# Patient Record
Sex: FEMALE | Race: WHITE | Employment: STUDENT | URBAN - METROPOLITAN AREA
[De-identification: names, ages, dates, MRNs, and addresses within clinical notes are randomized per-mention and may not be internally consistent; named-entity substitution may affect disease eponyms.]

---
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
## 2024-11-21 ENCOUNTER — TELEPHONE (OUTPATIENT)
Dept: OBGYN CLINIC | Facility: CLINIC | Age: 24
End: 2024-11-21

## 2024-11-21 DIAGNOSIS — N91.2 AMENORRHEA: Primary | ICD-10-CM

## 2024-11-21 NOTE — TELEPHONE ENCOUNTER
Patient calling to initiate prenatal care  LMP 10/23  Patient is 7-8 weeks on 12/18   Confirmation Ultrasound and Appointment scheduled on   Future Appointments   Date Time Provider Department Center   12/20/2024 10:15 AM [facility] US [facility] [address]   12/20/2024 11:45 AM Alivia Geronimo DO [facility] [address]   1/21/2025  3:30 PM Lev Lee MD [facility] [address]   2/17/2025  3:00 PM Esperanza Esqueda MD [facility] [address]         Any history of ectopic pregnancy? n  Any history of miscarriage? n  Any medications that you are taking on a regular basis other than prenatal vitamins? n (if not taking prenatal vitamins, encourage patient to start taking.)  Any bleeding since the first day of last LMP and your positive pregnancy test? n    Insurance [employer] PPO

## 2024-12-20 NOTE — PROGRESS NOTES
[facility]  Obstetrics and Gynecology    Subjective:     Diana Lewis is a 24 year old  female presents with c/o secondary amenorrhea and positive pregnancy test. The patient was recommended to return for further evaluation. The patient reports some constipation and gas pain, has tried Miralax/Metamucil intermittently w/o significant relief. Advised OTC Colace BID with prn simethicone for additional symptomatic relief. Patient's last menstrual period was 10/23/2024 (exact date)..     Pregnancy planned?: yes  Pregnancy desired? yes    Review of Systems:  General: no complaints per category. See HPI for additional information.   Breast: no complaints per category. See HPI for additional information.   Respiratory: no complaints per category. See HPI for additional information.   Cardiovascular: no complaints per category. See HPI for additional information.   GI: no complaints per category. See HPI for additional information.   : no complaints per category. See HPI for additional information.   Heme: no complaints per category. See HPI for additional information.     OB History:   OB History    Para Term  AB Living   1 0 0 0 0 0   SAB IAB Ectopic Multiple Live Births   0 0 0 0 0      # Outcome Date GA Lbr Al/2nd Weight Sex Type Anes PTL Lv   1 Current                Gyne History:  Menarche: 10  Period Cycle (Days): Pregnant  Period Duration (Days): N/A  Period Flow: N/A  Hx Prior Abnormal Pap: No  Pap Date: 10/10/22  Pap Result Notes: WNL  Patient's last menstrual period was 10/23/2024 (exact date).      Meds:  Medications Ordered Prior to Encounter[1]    All:  Allergies[2]    PMH:  Past Medical History:    Migraines       PSH:  Past Surgical History:   Procedure Laterality Date    [place] teeth removed         Objective:     Vitals:    24 1134   BP: 108/64   Pulse: 85   Weight: 181 lb 3.2 oz (82.2 kg)         Body mass index is 32.1 kg/m².    General: AAO.NAD.   CVS  exam: normal peripheral perfusion  Chest: non-labored breathing, no tachypnea   Abdominal exam: deferred   Pelvic exam: deferred        Imaging:  First Trimester US   GA by LMP: 8w2d   GA by US: 8w5d   FHT: 159 bpm   Impression: Single live IUP. Early viable. Gestational sac, yolk sac and fetal pole seen. Both ovaries wnl. No free fluid. Cardiac activity noted. Cervix appears closed and wnl.   RAMBO 2025 by LMP c/w 8 wk US        Assessment:     Diana Lewis is a 24 year old  female who presents for secondary amenorrhea and positive pregnancy test          Plan:     Patient Active Problem List    Diagnosis    Anxiety     Sertraline 25mg daily      Well adult exam    Injury of left ankle, initial encounter    Migraine with status migrainosus    Finger fracture    Allergic rhinitis, cause unspecified     negative allergy testing 09             Secondary amenorrhea  - a/w positive pregnancy test  - Ultrasound reviewed and discussed with patient, refer above  - Pt counseled on safety, diet, exercise, caffiene, tobacco, ETOH, sexual activity, ER precautions, diet, exercise, appropriate otc medication use, substance abuse   - Counseled on taking a PNV with 0.4mg of folic acid   - genetic screening testing d/w patient and family, pt declines invasive diagnostic testing and considering first versus second trimester screening   -Carrier screening, neural tube defect screening and hemoglobinopathy screening reviewed and discussed with patient; information provided and patient considering options and recommended to request desired screening at follow-up visit  - advised to follow up to establish prenatal care   - SAB precautions provided   - d/w nausea and vomiting in pregnancy including vitamin B 6 and unisom   - COVID 19 precautions provided, recommend vaccination and booster if/when applicable   -Travel precautions provided, patient advised to not travel beyond 36 weeks as long as the pregnancy  remains low risk.  Advised not to travel beyond 28 weeks for high risk pregnancy. Recommend compression socks, increase water intake and movement during traveling to prevent blood clots.    All of the findings and plan were discussed with the patient.  She notes understanding and agrees with the plan of care.  All questions were answered to the best of my ability at this time.      Total patient time was 30 minutes in evaluation, consultation, and coordination of care. This included face to face and non-face to face actions. The patient's questions and concerns that were addressed.     RTC in 4 weeks for NOB visit or sooner if needed     Alivia Geronimo,   [facility] - OBGYN     Discussed with patient that there will not be further notification of normal or benign results other than receiving results on [facility]. A [employer] message or telephone call will be placed by the physician and/or office staff if results are abnormal.         Note to patient and family   The 21st Century Cures Act makes medical notes available to patients in the interest of transparency.  However, please be advised that this is a medical document.  It is intended as ytpi-sc-ldpb communication.  It is written and medical language may contain abbreviations or verbiage that are technical and unfamiliar.  It may appear blunt or direct.  Medical documents are intended to carry relevant information, facts as evident, and the clinical opinion of the practitioner.      This note could include assistance by Dragon voice recognition. Errors in content may be related to improper recognition by the system; efforts to review and correct have been done but errors may still exist.          [1]   Current Outpatient Medications on File Prior to Visit   Medication Sig Dispense Refill    sertraline 25 MG Oral Tab Take 1 tablet (25 mg total) by mouth daily.      prenatal vitamin with DHA 27-0.8-228 MG Oral Cap Take 1 capsule by mouth daily.      SUMAtriptan  Succinate (IMITREX) 50 MG Oral Tab One po at HA onset, may repeat dose in 2 hrs prn. Max 2 pills in 24 hrs. 9 tablet 5    NORETHINDRONE ACET-ETHINYL EST 1-20 MG-MCG Oral Tab TAKE 1 TABLET DAILY 84 tablet 3    azithromycin (ZITHROMAX Z-DA) 250 MG Oral Tab Take two tablets today, then one tablet daily for 4 more days 6 tablet 0     No current facility-administered medications on file prior to visit.   [2] No Known Allergies

## 2024-12-20 NOTE — PATIENT INSTRUCTIONS
[facility]- Prenatal Testing    The following information is designed to help you understand some of the optional tests that are available to you to screen for problems in your pregnancy.  Before considering any of these tests, you will need to consider the following questions:    [1] What would you do if an abnormality were detected?  If the answer is nothing, then you may decide to decline all testing.  [2] Would you be willing to undergo testing which might increase your risk of miscarriage, such as an amniocentesis or CVS (see below)?  [3] If you have the initial testing, and it indicates that there might be a problem, and you subsequently decide not to do invasive testing such as an amniocentesis, would you worry excessively through the remainder of the pregnancy?    The following tests are available to screen for problems in your pregnancy:      [1]  First Trimester Screening (also called Nuchal Thickness, Nuchal Translucency or NT)- This is an abdominal ultrasound done between 10 and 14 weeks by a perinatology specialist (Maternal Fetal Medicine, MFM) which measures the thickness of the skin behind your baby's neck.  Increased thickness of the skin in this area has been linked to an increased risk of genetic abnormalities like Down Syndrome.  A second visit may be required if the baby is not in the correct position.  The ultrasound results are combined with a finger stick blood test to increase the sensitivity of the test.  You will need to schedule an appointment with the Maternal Fetal Medicine office.  Address and phone number below:  Please verify insurance coverage:  CPT code: 17108 (trejo) or 42118 (twins)  Diagnosis: Genetic Screening- Z36.8A  Maternal Fetal Medicine  Dr. Rushing, Dr. Ordoñez, Dr. Patterson, and Dr. Miller  [address] [facility] [address] [place] [address]  Phone [phone]  Fax [phone]    [2] Cell-Free DNA testing (NIPT)- This is a blood test done any time after 10-11  weeks which screens for genetic abnormalities like Down Syndrome.  It is greater than 98% sensitive but requires an amniocentesis for confirmation if abnormal.  It can also tell you the sex of the baby.  CPT code: 13954  Diagnosis code: Genetic screening- Z36.8A  Testing options:   Lydia- preferred for IHP patients or all patients.  Please call [phone] or go to [employer]/empower to review billing, prior authorizations or referrals  [id number]- Optional for all insurance plans except [facility].  Please call [employer] at [phone] or go to [employer]/patients/[email or web address]#/ to review billing, prior authorizations, or referrals      [3]  Quad Screen (also called AFP-plus or Tetra Screen)-  This is a simple blood test which screens for both genetic abnormalities like Down Syndrome and neural tube defects (NTDs), such as spina bifida (an abnormal opening in the spine which can cause paralysis).  It is usually performed around 16-20 weeks.  If the Quad screen comes back abnormal, it does not mean that your baby has an abnormality.  It only means that there is an increased risk of an abnormality.  Additional testing such as a Level II Ultrasound or Amniocentesis may be recommended (see below).  This test is less accurate at picking up genetic abnormalities than the cell-free DNA test.  If you have test #1 or 2, then usually only the AFP part of the Quad screen would be performed to screen for NTD's (see below).  Please verify insurance coverage:  CPT code: 03857  Diagnosis code: Genetic screening- Z36.8A    [4]  Alpha Fetoprotein (AFP, MSAFP)- This is a simple blood test that screens for neural tube defects such as spina bifida (an abnormal opening in the spine).  It is usually performed around 16-20 weeks.  Additional testing such as a Level II ultrasound may be recommended for an abnormal test result.  Please verify insurance coverage:  CPT code: 50030 Diagnosis code: Genetic Screening-  Z36.8A    ---------------------------------------------------------------------------------------------------------------    Carrier screening:     [5] Cystic Fibrosis/SMA Carrier Screening- Cystic Fibrosis is a genetic disease which causes lung problems in the infant.  SMA (spinal muscular atrophy) is a genetic disease that affects the nerve cells of the spinal cord.  These genetic defects would need to be passed from both parents to the child.  A blood test is performed on the mother, and if her test is positive, then the father should also be tested. These tests can be done at any time in the pregnancy, usually in the first trimester with your initial OB labs.  All babies are screened for cystic fibrosis after birth.  Please verify insurance coverage:  Cystic Fibrosis CPT Code: 84766 Diagnosis code: Cystic Fibrosis screening- Z13.228  SMA CPT Code: 88505 Diagnosis code: Genetic Screening- Z36.8A or Testing for Genetic Disease Carrier- Z13.71    [6] Hemoglobinopathy carrier screening: The hemoglobinopathies are heterogeneous genetic disorders of hemoglobin (Hb) typically inherited in an autosomal recessive pattern. The clinical presentation ranges from asymptomatic in carriers to mild to severe disease in homozygotes and compound heterozygotes. At the severe end of the spectrum, hemoglobinopathies impact quality of life, require life-long care (typically with regular blood transfusions), and can shorten life expectancy. In the [place], the American College of Obstetricians and Gynecologists (ACOG) recommends carrier screening and counseling for genetic conditions, ideally before pregnancy. Firstline for hemoglobinopathy carrier screening, includes a CBC evaluating red cell indices in all pregnant individuals [17]. A hemoglobin electrophoresis (or other protein chemistry method) is performed in addition to a CBC if there is suspicion of hemoglobinopathy based on ethnicity (, Mediterranean, Middle  Eastern, Southeast , or [place] descent) or if red cell indices show a low mean MCV or MCH. Characteristics other than ethnicity that are associated with a higher risk for an individual to be a hemoglobinopathy carrier include a history of chronic anemia or stillbirth, a relative with a hemoglobin structural variant or thalassemia, and consanguinity In 2022 ACOG updated its recommendations to offer universal hemoglobinopathy testing to people planning pregnancy or at the initial prenatal visit if no prior testing results are available for interpretation. This is based on a high frequency of a hemoglobinopathy trait, in particular S trait, in the [place] and noting that race and self-identified ethnicity are poor alternatives for genetics.  Hemoglobin electrophoresis: Send out lab to [facility].  CPT Code: 04747 or 87774 or 28940 Diagnosis code: Z13.0 Encounter screening for hematological disorder    Asthmatracker Carrier Screening: A carrier screening panel is available that includes cystic fibrosis, spinal muscular atrophy, hemoglobinopathy and additional autosomal recessive or X-linked disorders.  A full review of the carrier screening panel was available via [employer] website. Please call [phone] or go to [employer]/empower to review billing, prior authorizations or referrals.       ---------------------------------------------------------------------------------------------------------------    [7]  Level II Ultrasound-  This is an abdominal ultrasound done at approximately 20-21 weeks by a perinatology specialist (RAMIREZ) at [facility] which looks at many of the baby's internal structures.  Abnormalities in certain structures have been associated with an increased risk of genetic abnormalities.  It also screens for NTD's.  This ultrasound would replace the Level I Ultrasound that we normally perform at our office around the same time.    [8]  Amniocentesis-  During this procedure, a needle is  passed through your abdominal wall to withdrawal some amniotic fluid from around the baby.  It screens for both genetic abnormalities and NTD's and is usually performed around 15-16 weeks.  This test has the highest accuracy for detecting genetic abnormalities, but there may be a small risk of miscarriage or other complications.  A similar procedure called Chorionic Villus Sampling (CVS) is performed by passing a catheter through the vagina to remove a small sample of tissue from the placenta (afterbirth).  CVS may have a higher risk of miscarriage and other rare complications compared to amniocentesis but can be performed earlier in pregnancy.  Amniocentesis is often recommended/offered if any of the previously mentioned tests come back abnormal.  A pamphlet is available if you would like more information about this option.  If you decide to have an amniocentesis, the other tests would be unnecessary.      The above information covers some of the basics.  Pamphlets on the Cell-Free DNA test, Quad Screen and Cystic Fibrosis test should be in your prenatal packet.  Your doctor will discuss this information in more detail, and feel free to ask additional questions.  Also, remember that all of these tests are optional, and will only be performed at your request.  Testing that needs to be done through the perinatologist's office may require 2-3 weeks lead time to schedule.     Foods to Avoid During Pregnancy  Deli Meats- You may eat deli meats from the deli.  If you eat deli meats in the package, it may be contaminated with Listeria. Heat all deli meats in a package to 165 degrees Fahrenheit before eating, even if the label states the meat is “pre-cooked”.    Soft Cheeses and Unpasteurized Products - You may eat soft cheeses that are pasteurized.  Please avoid all soft cheeses, milk or juice that is unpasteurized.  Fish- avoid fish that contains a high level of mercury. These include but are not limited to; [place]  Orange Roughy, Tile Fish, Shark, Swordfish, and Devante Mackerel. Please see chart below for good fish choices in pregnancy. Also avoid any raw, uncooked shellfish such as oysters, clams, or sushi.    Make sure to cook all meats; including ground beef, pork, and poultry to their desired temperatures before consuming. This reduces the chance of a food borne illness.  Caffeine- limit to 200 mg or an 8oz cup daily or less.   Alcohol- no amount of alcohol is determined to be safe in pregnancy. Do not drink any alcoholic beverages while pregnant.  Medications Safe in Pregnancy  The following over-the-counter medications may be taken safely after 12 weeks gestation by any patient who is pregnant.  Please follow the instructions on the package for adult dosage.  If you experience any symptoms prior to 12 weeks, please call the office at [phone].      Colds/Congestion: Flonase, Saline Nasal Spray, Neti Pot, Plain Robitussin, Robitussin DM, Mucinex DM, Vicks 44 E, Vicks Vapor rub, Cough drops without Zinc or Sudafed.   Contact your doctor if you have a persistent fever over 100.4, shortness of breath, coughing up green mucus, or a sore throat that persists from more than 3 days    Diarrhea: Imodium, Maalox Anti-Diarrheal or Kaopectate  Contact the office if you have diarrhea for more than 3 days.    Allergies: Benadryl, Claritin or Zyrtec    Hemorrhoids: Tucks pads, Preparation H, Annusol, Sitz bath or Witch hazel  Eat a high fiber diet and drink plenty of fluids.    Yeast: Monistat 3 or 7  Call if your symptoms do not improve, or if you experience vaginal bleeding, or a watery discharge.    Constipation: Metamucil, Colace, fiber supplement, Milk of Magnesia or Dulcolax  Drink plenty of fluids, eat high-fiber foods and take the above laxatives sporadically.     Headache or Mild aches and pain: Extra Strength Tylenol     Gas: Gas X, Gelusil, Papaya Tablets, Maalox, Mylicon or Mylanta Gas    Heartburn: Tums, Mylanta, Pepcid  AC or Maalox    Sore throat: Alcohol free Chloraseptic spray or Lozenges     Nausea and Vomiting: ½ tablet Unisom plus 100mg of Vitamin B6 at bedtime (may increase B6 up to 200mg per day), Maxine root tea or raspberry leaf tea    Insomnia: Tylenol PM, Vitamin B6 50mg, warm bath or milk, Unisom, Nytol or Sominex.     We have listed a few medications for common symptoms seen in pregnancy.  Please contact the office if you are unsure about any over the counter medications that are not listed above.

## 2025-01-21 NOTE — PROGRESS NOTES
[facility]  Obstetrics and Gynecology  History & Physical    CC: Patient is here to establish prenatal care     Subjective:     HPI:  Diana Lewis is a 25 year old  female at 12w6d who presents today to establish prenatal care.      Denies vaginal bleeding, abdominal/pelvic pain, nausea and vomiting.     LMP: Patient's last menstrual period was 10/23/2024 (exact date).  RAMBO:  Estimated Date of Delivery: 25    OB:  OB History    Para Term  AB Living   1 0 0 0 0 0   SAB IAB Ectopic Multiple Live Births   0 0 0 0 0      # Outcome Date GA Lbr Al/2nd Weight Sex Type Anes PTL Lv   1 Current                  GYN:        STD hx- denies.   Pap hx- 10/2022 NILM - due 10/2025     PMH:   Past Medical History:    Migraines       PSH:    Past Surgical History:   Procedure Laterality Date    [place] teeth removed         MEDS:  Medications Ordered Prior to Encounter[1]    Allergies:    Allergies[2]  denies latex.     Immunizations:  Immunization History   Administered Date(s) Administered    Covid-19 Vaccine Pfizer 30 mcg/0.3 ml 2021, 2021    DTAP 2005    DTAP/HIB 2000, 2000, 2000, 2001    HEP A 2012, 2013    HEP B 2000, 10/20/2000, 2001    HIB 2000, 2000, 2000, 2001, 2001    Hpv Virus Vaccine 9 Leana Im 2020, 2020, 2020    IPV 2000, 2000, 2001, 2005    MMR 2001, 2005    Meningococcal-Menactra 2011, 2016    TDAP 2011, 2021    Tb Intradermal Test 2001, 2020    Varicella Deferred (Had Chicken Pox) 2004       Family Hx:      Family History   Problem Relation Age of Onset    Other (Other) Mother         polycystic kidney disease    Other (PCKD) Mother     Diabetes Maternal Grandfather     Breast Cancer Maternal Grandmother     Other (polycystic kidney) Maternal Grandmother        SocialHx:         Social History     Socioeconomic History    Marital status: Single   Occupational History    Occupation: --finishing at [employer]   Tobacco Use    Smoking status: Never    Smokeless tobacco: Never   Vaping Use    Vaping status: Never Used   Substance and Sexual Activity    Alcohol use: Not Currently    Drug use: No    Sexual activity: Yes     Partners: Male   Other Topics Concern     Service No    Blood Transfusions No    Caffeine Concern Yes    Exercise Yes     Comment: 3 times per week    Seat Belt Yes     Social Drivers of Health     Financial Resource Strain: Low Risk  (1/20/2025)    Financial Resource Strain     Difficulty of Paying Living Expenses: Not hard at all     Med Affordability: No   Food Insecurity: No Food Insecurity (1/20/2025)    Food Insecurity     Food Insecurity: Never true   Transportation Needs: No Transportation Needs (1/20/2025)    Transportation Needs     Lack of Transportation: No   Stress: No Stress Concern Present (1/20/2025)    Stress     Feeling of Stress : No   Housing Stability: Low Risk  (1/20/2025)    Housing Stability     Housing Instability: No         Patient feels unsafe or threatened?: No       Health maintenance:  Mammogram (age 40 and q1-2yr):  N/A  Colonoscopy (age 50 and q10yr):  N/A    Review of Systems:  General: no complaints per category. See HPI for additional information.   Breast: no complaints per category. See HPI for additional information.   Respiratory: no complaints per category. See HPI for additional information.   Cardiovascular: no complaints per category. See HPI for additional information.   GI: no complaints per category. See HPI for additional information.   : no complaints per category. See HPI for additional information.   Heme: no complaints per category. See HPI for additional information.     Objective:     PE:  Vitals: /78   Pulse 97   Ht 63\"   Wt 185 lb (83.9 kg)   LMP 10/23/2024 (Exact Date)   BMI  32.77 kg/m²   Gen: A/O, NAD  Head/Neck: neck supple, thyroid non-enlarged, symmetric and without nodules  Breasts: breasts symmetric, no dominant or suspicious mass, no skin or nipple changes and no axillary adenopathy  CV: normal peripheral perfusion   Lungs: no tachypnea, non-labored breathing   Abdominal exam: soft, nontender, nondistended  Ext: non-tender, no edema  :  1) External Genitalia -  Normal appearing skin and hair distribution, no visible lesions.   2) Vagina:  mucosa appears pink, and well rugated.   3) Cervix:  pink cervix with visible ectropion friable to touch.   4) Uterus: Anteverted. Mobile. 16 week gestational size.   5) Adnexa/parametria:  Non tender adnexa. No masses.     Fetal Well Being Assessment:     BPM    Assessment/Plan:     Diana Lewis is a 25 year old  female at 12w6d who presents today to establish prenatal care.    Patient Active Problem List    Diagnosis    Anxiety     Sertraline 25mg daily      Well adult exam    Injury of left ankle, initial encounter    Migraine with status migrainosus    Finger fracture    Allergic rhinitis, cause unspecified     negative allergy testing 09           Prenatal care  - prenatal labs ordered  - Pap: collected given presence of cervical ectropion   - Cervical cultures: GC, Chl  collected and ordered   - continue PNV daily given        Genetic Screening tests  - Discussion held with patient about genetic screening. Discussion including first trimester versus second trimester screening. Patient and partner declined genetic screening. Patient offered Amniocentesis and declined. Pt considering first or second trimester screening. Advised to call the office if she would like to pursue first trimester screening as recommended to complete at 13wks GA  - Cystic fibrosis/SMA/hemoglobinopathy carrier screening: offered and patient will consider, advised to notify office if she desires order        Patient education  - Pt  counseled on safety, diet, exercise, caffiene, tobacco, ETOH, sexual activity, ER precautions, diet, exercise, appropriate weight gain, travel, appropriate otc medication use, substance abuse and pets.  - Counseled on taking a PNV with 0.4mg of folic acid   - Limiting intake of alcohol, coffee, tea, soda, and other foods containing caffeine to 200 mg every day   - Limit intake of fish to twice weekly to limit mercury exposure  - COVID 19 precautions provided    - Pregnancy BMI guidelines:    <18.5 = underweight = 28-40 lbs gain = 1 lb/wk (2nd/3rd tri)   18.5 - 24.9 = normal = 25 - 35 lbs gain = 1 lb/wk   25 - 29.9 = overweight = 15 - 25 lbs gain = 0.6 lb/wk   > 30 = obese = 11 - 20 lbs gain = 0.5 lb/wk       RTC in 4 wks     Lev Lee MD   [facility] - OBGYN      Note to patient and family   The 21st Century Cures Act makes medical notes available to patients in the interest of transparency.  However, please be advised that this is a medical document.  It is intended as woqo-nl-uxte communication.  It is written and medical language may contain abbreviations or verbiage that are technical and unfamiliar.  It may appear blunt or direct.  Medical documents are intended to carry relevant information, facts as evident, and the clinical opinion of the practitioner.                        [1]   Current Outpatient Medications on File Prior to Visit   Medication Sig Dispense Refill    sertraline 25 MG Oral Tab Take 1 tablet (25 mg total) by mouth daily.      prenatal vitamin with DHA 27-0.8-228 MG Oral Cap Take 1 capsule by mouth daily.      SUMAtriptan Succinate (IMITREX) 50 MG Oral Tab One po at HA onset, may repeat dose in 2 hrs prn. Max 2 pills in 24 hrs. (Patient not taking: Reported on 1/21/2025) 9 tablet 5     No current facility-administered medications on file prior to visit.   [2] No Known Allergies

## 2025-01-21 NOTE — PATIENT INSTRUCTIONS
Maternal Fetal Medicine  Dr. Rushing, Dr. Ordoñez, Dr. Patterson, and Dr. Miller  [address] [facility] [address] [place] [id number]  Phone [phone]  Fax [phone]    [2] Cell-Free DNA testing (NIPT)- This is a blood test done any time after 10-11 weeks which screens for genetic abnormalities like Down Syndrome.  It is greater than 98% sensitive but requires an amniocentesis for confirmation if abnormal.  It can also tell you the sex of the baby.  CPT code: 79156  Diagnosis code: Genetic screening- Z36.8A  Testing options:   Lydia- preferred for IHP patients or all patients.  Please call [phone] or go to [email or web address] to review billing, prior authorizations or referrals  [id number]- Optional for all insurance plans except IHP.  Please call [employer] at [phone] or go to [email or web address] to review billing, prior authorizations, or referrals    ---------------------------------------------------------------------------------------------------------------    Carrier screening:     [5] Cystic Fibrosis/SMA Carrier Screening- Cystic Fibrosis is a genetic disease which causes lung problems in the infant.  SMA (spinal muscular atrophy) is a genetic disease that affects the nerve cells of the spinal cord.  These genetic defects would need to be passed from both parents to the child.  A blood test is performed on the mother, and if her test is positive, then the father should also be tested. These tests can be done at any time in the pregnancy, usually in the first trimester with your initial OB labs.  All babies are screened for cystic fibrosis after birth.  Please verify insurance coverage:  Cystic Fibrosis CPT Code: 91321 Diagnosis code: Cystic Fibrosis screening- Z13.228  SMA CPT Code: 11243 Diagnosis code: Genetic Screening- Z36.8A or Testing for Genetic Disease Carrier-  Z13.71      ---------------------------------------------------------------------------------------------------------------    [7]  Level II Ultrasound-  This is an abdominal ultrasound done at approximately 20-21 weeks by a perinatology specialist (RAMIREZ) at [facility] which looks at many of the baby's internal structures.  Abnormalities in certain structures have been associated with an increased risk of genetic abnormalities.  It also screens for NTD's.  This ultrasound would replace the Level I Ultrasound that we normally perform at our office around the same time.         Low-dose aspirin 81 mg: Take 1 tablet by mouth daily starting at 12 weeks until 36 weeks     We generally follow the USPSTF criteria and recommend low-dose aspirin for preeclampsia prevention to patients with two or more of the following moderate risk factors [22]:     ?Nulliparity.     ?Obesity (body mass index >30 kg/m2).     ?Family history of preeclampsia in mother or sister.     ?Age >=35 years.     ?Sociodemographic characteristics (Black persons, lower income level [recognizing that these are not biological factors]).     ?Personal risk factors (eg, previous pregnancy with low birth weight or small for gestational age infant, previous adverse pregnancy outcome [eg, stillbirth], interval >10 years between pregnancies).

## 2025-02-03 NOTE — TELEPHONE ENCOUNTER
14w5d  1 hour glucose tolerance test included with prenatal labs.  Patient instructed to complete labs as ordered.

## 2025-02-17 NOTE — TELEPHONE ENCOUNTER
Received [facility] paperwork for spouse, and KELLY. [phone]    Did not received $25 fee.

## 2025-02-17 NOTE — PROGRESS NOTES
[facility]  Obstetrics and Gynecology  Routine OB Visit Progress Note    Subjective:     Diana Lewis is a 25 year old  at 16w5d who presents for routine OB visit.  Patient reports doing well. Denies contractions, vaginal bleeding or leakage of fluid. Good fetal movement.    Rh neg   Genetic screening declined    AFP declined  Recommended Anatomy scan 20 wks   Prenatal labs reviewed     Objective:   /78   Ht 63\"   Wt 184 lb (83.5 kg)   LMP 10/23/2024 (Exact Date)   BMI 32.59 kg/m²   ABD: gravid, nontender  FHT: 140         Assessment:     Diana Lewis is a 25 year old  at 16w5d who presents for routine OB visit. Overall doing well.     Problem List Items Addressed This Visit    None      Plan:     Patient Active Problem List    Diagnosis    Rh negative state in antepartum period (HCC)     [ ] Rhogam at 28 weeks       Obesity in pregnancy (HCC)     -Declines genetic screening   [X] Early 1 hr GTT - passed   [ ] BASA   [ ] Level 2/MFM       Anxiety     Sertraline 25mg daily  [ ] Level 2/MFM       Migraine with status migrainosus    Allergic rhinitis, cause unspecified     negative allergy testing 09         - continue routine prenatal care   - labor and rupture of membrane precautions provided    Return to clinic in 4 weeks for SHERON visit     Future Appointments   Date Time Provider Department Center   3/13/2025  3:00 PM [facility] PNORM1 [facility]   3/18/2025  4:00 PM Cielo Hobson APN [facility] [place]   2025  4:00 PM Alivia Geronimo DO [facility] P [facility] [address]         Esperanza Esqueda MD   [facility] - [facility]BGYN    Note to patient and family   The  Cent[place] Cures Act makes medical notes available to patients in the interest of transparency.  However, please be advised that this is a medical document.  It is intended as fukf-wa-dkgr communication.  It is written and medical language may contain abbreviations or verbiage that are technical  and unfamiliar.  It may appear blunt or direct.  Medical documents are intended to carry relevant information, facts as evident, and the clinical opinion of the practitioner.

## 2025-03-03 NOTE — TELEPHONE ENCOUNTER
Dr. Esqueda      Please sign off on form if you agree to: Family Medical Leave Act (spouse) paternity start: RAMBO 2025  Start: 6 weeks vaginal,  8 weeks      (place your signature on the first page only)    -From your Inbasket, Highlight the patient and click Chart   -Double click the 2025 Forms Completion telephone encounter  -Scroll down to the Media section   -Click the blue Hyperlink: Family Medical Leave Act (spouse) Dr Esqueda 25  -Click Acknowledge located in the top right ribbon/menu   -Drag the mouse into the blank space of the document and a + sign will appear. Left click to   electronically sign the document.     Thank you,    Sergio'

## 2025-03-06 NOTE — PROGRESS NOTES
Outpatient Maternal-Fetal Medicine Consultation    Dear Dr. Lee    Thank you for requesting ultrasound evaluation and maternal fetal medicine consultation on your patient Diana Lewis.  As you are aware she is a 25 year old female  with a singletonpregnancy and an Estimated Date of Delivery: 25.  A maternal-fetal medicine consultation was requested secondary to obesity.  Her prenatal records and labs were reviewed.    HISTORY  # 1 - Date: None, Sex: None, Weight: None, GA: None, Type: None, Apgar1: None, Apgar5: None, Living: None, Birth Comments: None      Past Medical History  The patient  has a past medical history of Finger fracture (2011), Injury of left ankle, initial encounter (10/10/2017), Migraines, and Well adult exam (2018).    Past Surgical History  The patient  has a past surgical history that includes wisdom teeth removed.    Family History  The patient She indicated that her mother is alive. She indicated that her father is alive. She indicated that the status of her maternal grandmother is unknown. She indicated that the status of her maternal grandfather is unknown.      Medications:   Current Outpatient Medications:     Docusate Sodium (STOOL SOFTENER OR), Take by mouth daily as needed., Disp: , Rfl:     BABY ASPIRIN OR, Take by mouth., Disp: , Rfl:     sertraline 25 MG Oral Tab, Take 1 tablet (25 mg total) by mouth daily., Disp: , Rfl:     prenatal vitamin with DHA 27-0.8-228 MG Oral Cap, Take 1 capsule by mouth daily., Disp: , Rfl:     SUMAtriptan Succinate (IMITREX) 50 MG Oral Tab, One po at HA onset, may repeat dose in 2 hrs prn. Max 2 pills in 24 hrs. (Patient not taking: Reported on 2025), Disp: 9 tablet, Rfl: 5  Allergies: Allergies[1]    PHYSICAL EXAMINATION:  /77 (BP Location: Right arm, Patient Position: Sitting, Cuff Size: adult)   Pulse 88   Ht 5' 3\" (1.6 m)   Wt 194 lb (88 kg)   LMP 10/23/2024 (Exact Date)   BMI 34.37 kg/m²    General: alert and oriented,no acute distress  Abdomen: gravid, soft, non-tender  Extremities: non-tender, no edema    OBSTETRIC ULTRASOUND  The patient had a level II ultrasound today which revealed size consistent with dates and a normal detailed anatomic survey.      Ultrasound Findings:  Single IUP in breech presentation.    Placenta is posterior.   A 3 vessel cord is noted.  Cardiac activity is present at 154 bpm   g ( 0 lb 13 oz);   MVP is 5.1 cm .     The fetal measurements are consistent with the established EDC. No ultrasound evidence of structural abnormalities are seen today. The nasal bone is present. No ultrasound evidence of markers for aneuploidy are seen. She understands that ultrasound exam cannot exclude genetic abnormalities and that genetic testing is recommended. The limitations of ultrasound were discussed.     Uterus and adnexa appeared normal  today on US    See PACS/Imaging Tab For Complete Ultrasound Report  I interpreted the results and reviewed them with the patient.    DISCUSSION  During her visit we discussed and reviewed the following issues:  OBESITY  This patient has obesity; her pre-gravid BMI is: 31  Obesity during pregnancy is associated with numerous maternal and  risks.  It is not clear whether obesity is a direct cause of adverse pregnancy outcome or whether the association between obesity and adverse pregnancy outcome is due to factors such as diabetes mellitus.   Data suggest that obese women should be encouraged to undertake a weight reduction program (diet, exercise, behavior modification, and possibly bariatric surgery in some cases) prior to attempting to conceive.            Subfertility in obese women is most commonly related to ovulatory dysfunction, and, in some obese women, the ovulatory dysfunction is related to polycystic ovary syndrome (PCOS). It is also important to note that even among ovulatory women, increasing obesity is associated with  decreasing spontaneous pregnancy rates.  The increased risk of miscarriage in obese women may be because such women often have PCOS or isolated insulin resistance.                 Due to its strong association with obesity in the general population, type 2 diabetes mellitus is one of the two most common medical complications of the obese . The increased risk of type 2 diabetes is primarily related to an exaggerated increase in insulin resistance in the obese state. It is reasonable to screen obese gravidas for undiagnosed pregestational diabetes in the first trimester.   Glucose intolerance associated with gestational diabetes generally resolves postpartum; however, obese women with a history of gestational diabetes have a two-fold increased prevalence of subsequent type 2 diabetes.           An association between obesity and hypertensive disorders during pregnancy has been consistently reported.  In particular, maternal weight and BMI are independent risk factors for preeclampsia.             Studies have found that the increased risk of  birth in obese gravidas is primarily associated with obesity-related medical and  complications, rather than an intrinsic predisposition to spontaneous  birth. Prevention of  birth in these patients, therefore, should be directed toward prevention or management of medical and obstetrical complications.               Both prepregnancy obesity and excessive maternal weight gain before or during pregnancy contribute to an increased probability of  delivery.  It has also been hypothesized that obesity may lead to dystocia due to increased soft tissue deposition in the maternal pelvis.    delivery in the obese  is associated with numerous perioperative concerns, including emergency delivery, prolonged incision to delivery interval, blood loss >1000 mL, longer operative times, wound infection, thromboembolism, and  endometritis.            Maternal obesity appears to be associated with a small increase in the absolute rate of some congenital anomalies, and the risk may increase with increasing maternal weight.  The risk of neural tube defects increased significantly with maternal weight.    The analysis found that overweight and obese pregnant women experienced significantly more stillbirths than normal weight women.      Increased  testing and Level 2 Ultrasound is recommended.     SSRI USE IN PREGNANCY         We discussed what is known about the safety of selective serotonin receptor inhibitors (SSRI) in pregnancy.    Current evidence does not support that there is an increased risk of teratogenic effects with maternal use of SSRIs.   Nonteratogenic effects that may seen in the  period include respiratory distress, cyanosis, apnea, siezures, temperature instability, feeding difficulty, vomiting, hypoglycemia, increased or decreased tone, irritability, crying, hyper-reflexia, and jitteriness or tremor.   Exposure to some SSRIs (paroxetine(Paxil)) late in pregnancy has also been associated with persistent pulmonary hypertension of the  although the absolute risk is still quite low.   Adverse effects may be due to toxic effects of the SSRI or drug withdrawal due to discontinuation. The long-term effects of in utero SSRI exposure on infant development and behavior are not known.           Due to pregnancy-induced physiologic changes, women who are pregnant may require increased doses of SSRI's to achieve euthymia. Women treated for major depression and who are euthymic prior to pregnancy are more likely to experience a relapse when medication is discontinued as compared to pregnant women who continue taking antidepressant medications. The ACOG recommends that therapy with SSRIs or SNRIs during pregnancy be individualized; treatment of depression during pregnancy should incorporate the clinical expertise  of the mental health clinician, obstetrician, primary healthcare provider, and pediatrician (ACOG, 2007). The ACOG also recommend that therapy with paroxetine(Paxil) be avoided during pregnancy if possible and that fetuses exposed in early pregnancy be assessed with a fetal echocardiography.              If treatment during pregnancy is required, tapering therapy is not advised during the third trimester despite the potential for withdrawal symptoms in the infant.  Other evidence supports that inadequately treated maternal mood disorders has other effects on  behavior that can be related to impaired mother-infant bonding from inadequately treated depression/anxiety.  Although this outcome is more difficult to measure, the repercussions can be significant.  In addition, even with immediate use of SSRI's after delviery, given their prolonged half-life, clinical effectiveness can take several weeks.  This exposes the patient to potentially significant risks of exacerbation of her mood disorder at a particularly vulnerable time.  This further emphasizes the need for an individualized approach to care.     IMPRESSION:  IUP at 20w1d  Obesity  SSRI Use In Pregnancy    RECOMMENDATIONS:  Continue care with Dr. Lee  Growth US & BPP at 32 weeks  Weekly NSTs at 36 weeks  11-20 lb weight gain for pregnancy    Thank you for allowing me to participate in the care of your patient.  Please do not hesitate to contact me if additional questions or concerns arise.      Yury Ordoñez M.D.    40 minutes spent in review of records, patient consultation, documentation and coordination of care.  The relevant clinical matter(s) are summarized above.     Note to patient and family  The 21st Century Cures Act makes medical notes available to patients in the interest of transparency.  However, please be advised that this is a medical document.  It is intended as gaol-ew-pupk communication.  It is written and medical language may  contain abbreviations or verbiage that are technical and unfamiliar.  It may appear blunt or direct.  Medical documents are intended to carry relevant information, facts as evident, and the clinical opinion of the practitioner.         [1] No Known Allergies

## 2025-03-13 NOTE — TELEPHONE ENCOUNTER
Pt dropped off FMAL from [facility]. KELLY and Fee received. Emailed and sent hard copy via inter-office mail to forms dept.

## 2025-03-13 NOTE — PROGRESS NOTES
SHERON    GA: 20w1d  Vitals:    25 1606   BP: 100/74   Pulse: 93   Weight: 194 lb (88 kg)   Height: 63\"       Doing well  No complaints. Denies LOF/VB/uctx  RH negative  Genetic testing declined   Level II Scan completed today and unremarkable   CBC and 1 hr GTT order and instructions provided    Assessment:     Diana Lewis is a 25 year old  at 20w1d who presents for routine OB visit. Overall doing well.     Problem List Items Addressed This Visit       Allergic rhinitis    Migraine with status migrainosus    Anxiety    Obesity in pregnancy ([facility])    Rh negative state in antepartum period ([facility])     Other Visit Diagnoses       Encounter for supervision of normal first pregnancy in second trimester ([facility])    -  Primary    Relevant Orders    CBC With Differential With Platelet    Glucose Tolerance, 50 gm (1 hr), Gestational (ADA)                Plan:     Patient Active Problem List    Diagnosis    Rh negative state in antepartum period ([facility])     [ ] Rhogam at 28 weeks       Obesity in pregnancy ([facility])     -Declines genetic screening   [X] Early 1 hr GTT - passed   [x] BASA   [x] Level 2/MFM       Anxiety     Sertraline 25mg daily  [ ] Level 2/MFM       Migraine with status migrainosus    Allergic rhinitis     negative allergy testing 09         - continue routine prenatal care   - labor and rupture of membrane precautions provided    Return to clinic in 4 weeks for SHERON visit     Alivia Geronimo DO   [facility] - OBGYN    Note to patient and family   The 21st Century Cures Act makes medical notes available to patients in the interest of transparency.  However, please be advised that this is a medical document.  It is intended as ofmx-hq-blzm communication.  It is written and medical language may contain abbreviations or verbiage that are technical and unfamiliar.  It may appear blunt or direct.  Medical documents are intended to carry relevant information, facts as evident, and the clinical  opinion of the practitioner.

## 2025-03-13 NOTE — PATIENT INSTRUCTIONS
General Instructions for Common Concerns in Pregnancy    The following instructions are recommended by the OB/GYNE Department at [facility]. If symptoms persist for more than 2-3 days, please contact the office for further assistance at [phone].    Symptoms of a cold: sneezing, coughing, headache, runny nose, watery eyes, slightly elevated temperature (under 100). A temperature over 100, with a productive cough (yellow-green mucus), needs evaluation by your primary care provider. There is no cure for the cold/flu... it takes time!  Rest Chicken soup Increase fluid intake   Cepacol, Sucrets lozenges, or Halls (no Zinc) Robitussin cough syrup, any kind Sudafed       Heartburn or Upset Stomach: *Liquid antacids are frequently more effective than tablets. Call the office if no relief. Avoid spicy food or any food that may cause symptoms.  Maalox or Maalox Plus Pepcid Tums   Mylanta ll, sodium free Zantac 75mg twice daily Tagamet   Milk of Magnesia          Headache or Mild aches and pains associated with colds or flu: Call office if persistent or severe.   Rest Tylenol or Extra Strength Tylenol   (Acetaminophen)                                                           Diarrhea(simple): Call office if persists more than 2 days or if accompanied by a fever.  Contact the office if you have been in a foreign country, if other family members have been ill, or if there is a possibility of contact with contaminated food/water prior to onset.  Minimum residue diet: No milk, fruits or vegetables other than peeled, cooked potatoes. Avoid spicy or greasy foods. You can have liquids, bread, noodles, rice, plain pasta, and tender cooked meat. Fish and poultry may be eaten as desired.  Kaopectate  Immodium A-D Lomotil       Constipation (with no nausea or vomiting): Increase fluids, fiber, and activity. Call with severe straining.  Milk of Magnesia Colace 50 mg 1-2 caps daily Prune Juice   Metamucil/Benefiber          Hemorrhoids: Call if severe bleeding or pain  Preparation H Anusol Suppositories Tucks           OVER THE COUNTER MEDICATION USE IN PREGNANCY    NO ASPIRIN, IBUPROFIN (ADVIL, MOTRIN, OR ALEVE), OR ZINC    The following over the counter (OTC) medications may be safely taken by pregnant women following all the directions on the package for adult usage.    Cold, flu, and allergy relief: Nasal congestion, cough, sneezing, runny nose, minor aches and pains, scratchy/sore throat  Benadryl Class B   Benadryl Cold or Allergy Sinus Class C   Claritin, Claritin D Class B   Chlor-trimeton Allergy Class B   Chlor-trimeton Allergy-sinus Class C   Comtrex Allergy Sinus/Multi Symptom Cough Class C   Contact (Severe cold and flu, Decongestant, Antihistamine, Day and Night Cold/Flu) Class C   Ocean Nasal Spray/Mist Class B   Robitussin CF, DM, PE/Robitussin Cough and Cold/Robitussin Cough, Cold, and Flu Class C   Sinutab Sinus Allergy Class C   Sudafed/Tavist- D Class C   Theraflu Cold and Cough/Flu and Cold Class C   Tylenol Regular and Extra strength Class B   Tylenol Cold, Multi-symptom, Cold Nighttime Class C   Vicks Formula 44, 44D, and 44E Class C     Gas:  Gas X, Mylicon Drops, Mylanta Gas Class C     Motion Sickness:  Dramamine/Dramamine ll Class B     Yeast Symptoms:  Monistat 7 Class B     Insomnia:  Sominex Class B                     Morning sickness prevention and remedies:  Eat a piece of toast or a few crackers before you get out of bed in the morning ( place them by your bed the night before), or when you feel nauseated.   Get out of bed slowly and avoid sudden movements.   Eat smaller, more frequent meals during the day so your stomach does not remain empty for very long.   Eat high protein foods: eggs, cheese, nuts, meats,  ect, as well as fruits/fruit juices. These foods help prevent low levels of sugar in your blood, which can also cause nausea.   Sip spearmint, peppermint, tila, or raspberry leaf tea.  Ginger ale, 7 up , or Sprite   Vitamin B6 25 mg/day to start; if not helpful, increase by 25 mg/day until taking 100 mg/day maximum.   Medication and Mother's Milk, 18th ED, 2019 and Physician's Desk Reference, 71st Ed, 2017

## 2025-03-13 NOTE — PROGRESS NOTES
Pt here for Level II Ultrasound  +fm noted per patient  Pt denies complaints  Pt noted rash/eczema? Upper body, pt denies itching. Pt has been using cetaphil, reports helping.

## 2025-03-21 NOTE — TELEPHONE ENCOUNTER
Dr. Geronimo    Please sign off on form if you agree to: Family Medical Leave Act for Maternity    -Signature page will be the first page scanned  -From your Inbasket, Highlight the patient and click Chart   -Double click the 3/13/25 Forms Completion telephone encounter  -Scroll down to the Media section   -Click the blue Hyperlink: Family Medical Leave Act   Dr. Geronimo  3/21/25  -Click Acknowledge located in the top right ribbon/menu   -Drag the mouse into the blank space of the document and a + sign will appear. Left click to   electronically sign the document.  -Once signed, simply exit out of the screen and you signature will be saved.     Thank you,    Farhat BRADSHAW

## 2025-03-25 NOTE — TELEPHONE ENCOUNTER
Family Medical Leave Act form completed and signed by provider. Faxed to [facility] [phone] confirmation received.

## 2025-04-02 NOTE — TELEPHONE ENCOUNTER
GA: 23w0d  Please see pt message & advise on any other recommendations for pt since she is [place]. Thank you.

## 2025-04-07 NOTE — TELEPHONE ENCOUNTER
Alivia Geronimo, DO to [facility]       4/4/25 12:57 PM  If hemorrhoid is large, painful, and persistently bleeding I would recommend ER presentation for further evaluation. If she is otherwise stable, I recommend outpatient consultation with general surgery for hemorrhoid removal

## 2025-04-17 NOTE — PROGRESS NOTES
SHERON    GA: 25w1d  Vitals:    25 1604   BP: 104/60   Pulse: 89   Weight: 199 lb 2 oz (90.3 kg)   Height: 63\"       Doing well. Denies LOF/VB/uctx. +FM.   RH negative  S/P Anatomy scan wnl  1 hr glucose and CBC (24-28wks), to be completed tomorrow      Assessment:     Diana Lewis is a 25 year old  at 25w1d who presents for routine OB visit. Overall doing well.     Problem List Items Addressed This Visit       Allergic rhinitis    Migraine with status migrainosus    Anxiety    Obesity in pregnancy ([facility])    Rh negative state in antepartum period ([facility])    Encounter for supervision of normal first pregnancy in second trimester ([facility]) - Primary           Plan:     Patient Active Problem List    Diagnosis    Encounter for supervision of normal first pregnancy in second trimester ([facility])    Rh negative state in antepartum period ([facility])     [ ] Rhogam at 28 weeks       Obesity in pregnancy ([facility])     -Declines genetic screening   [X] Early 1 hr GTT - passed   [x] BASA   [x] Level 2/MFM   Growth US & BPP at 32 weeks  Weekly NSTs at 36 weeks  11-20 lb weight gain for pregnancy      Anxiety     Sertraline 25mg daily  [x] Level 2 with MFM       Migraine with status migrainosus    Allergic rhinitis     negative allergy testing 09         - continue routine prenatal care   - labor and rupture of membrane precautions provided  -Fetal movement instructions given    Return to clinic in 4 weeks for SHERON visit           Note to patient and family   The  [employer] Cures Act makes medical notes available to patients in the interest of transparency.  However, please be advised that this is a medical document.  It is intended as gfxs-os-gqdy communication.  It is written and medical language may contain abbreviations or verbiage that are technical and unfamiliar.  It may appear blunt or direct.  Medical documents are intended to carry relevant information, facts as evident, and the clinical opinion of the  practitioner.      Alivia Geronimo, DO  [facility] - OBGYN

## 2025-04-17 NOTE — PATIENT INSTRUCTIONS
General Instructions for Common Concerns in Pregnancy    The following instructions are recommended by the OB/GYNE Department at [facility]. If symptoms persist for more than 2-3 days, please contact the office for further assistance at [phone].    Symptoms of a cold: sneezing, coughing, headache, runny nose, watery eyes, slightly elevated temperature (under 100). A temperature over 100, with a productive cough (yellow-green mucus), needs evaluation by your primary care provider. There is no cure for the cold/flu... it takes time!  Rest Chicken soup Increase fluid intake   Cepacol, Sucrets lozenges, or Halls (no Zinc) Robitussin cough syrup, any kind Sudafed       Heartburn or Upset Stomach: *Liquid antacids are frequently more effective than tablets. Call the office if no relief. Avoid spicy food or any food that may cause symptoms.  Maalox or Maalox Plus Pepcid Tums   Mylanta ll, sodium free Zantac 75mg twice daily Tagamet   Milk of Magnesia          Headache or Mild aches and pains associated with colds or flu: Call office if persistent or severe.   Rest Tylenol or Extra Strength Tylenol   (Acetaminophen)                                                           Diarrhea(simple): Call office if persists more than 2 days or if accompanied by a fever.  Contact the office if you have been in a foreign country, if other family members have been ill, or if there is a possibility of contact with contaminated food/water prior to onset.  Minimum residue diet: No milk, fruits or vegetables other than peeled, cooked potatoes. Avoid spicy or greasy foods. You can have liquids, bread, noodles, rice, plain pasta, and tender cooked meat. Fish and poultry may be eaten as desired.  Kaopectate  Immodium A-D Lomotil       Constipation (with no nausea or vomiting): Increase fluids, fiber, and activity. Call with severe straining.  Milk of Magnesia Colace 50 mg 1-2 caps daily Prune Juice   Metamucil/Benefiber          Hemorrhoids: Call if severe bleeding or pain  Preparation H Anusol Suppositories Tucks           OVER THE COUNTER MEDICATION USE IN PREGNANCY    NO ASPIRIN, IBUPROFIN (ADVIL, MOTRIN, OR ALEVE), OR ZINC    The following over the counter (OTC) medications may be safely taken by pregnant women following all the directions on the package for adult usage.    Cold, flu, and allergy relief: Nasal congestion, cough, sneezing, runny nose, minor aches and pains, scratchy/sore throat  Benadryl Class B   Benadryl Cold or Allergy Sinus Class C   Claritin, Claritin D Class B   Chlor-trimeton Allergy Class B   Chlor-trimeton Allergy-sinus Class C   Comtrex Allergy Sinus/Multi Symptom Cough Class C   Contact (Severe cold and flu, Decongestant, Antihistamine, Day and Night Cold/Flu) Class C   Ocean Nasal Spray/Mist Class B   Robitussin CF, DM, PE/Robitussin Cough and Cold/Robitussin Cough, Cold, and Flu Class C   Sinutab Sinus Allergy Class C   Sudafed/Tavist- D Class C   Theraflu Cold and Cough/Flu and Cold Class C   Tylenol Regular and Extra strength Class B   Tylenol Cold, Multi-symptom, Cold Nighttime Class C   Vicks Formula 44, 44D, and 44E Class C     Gas:  Gas X, Mylicon Drops, Mylanta Gas Class C     Motion Sickness:  Dramamine/Dramamine ll Class B     Yeast Symptoms:  Monistat 7 Class B     Insomnia:  Sominex Class B                     Morning sickness prevention and remedies:  Eat a piece of toast or a few crackers before you get out of bed in the morning ( place them by your bed the night before), or when you feel nauseated.   Get out of bed slowly and avoid sudden movements.   Eat smaller, more frequent meals during the day so your stomach does not remain empty for very long.   Eat high protein foods: eggs, cheese, nuts, meats,  ect, as well as fruits/fruit juices. These foods help prevent low levels of sugar in your blood, which can also cause nausea.   Sip spearmint, peppermint, tila, or raspberry leaf tea.  Ginger ale, 7 up , or Sprite   Vitamin B6 25 mg/day to start; if not helpful, increase by 25 mg/day until taking 100 mg/day maximum.   Medication and Mother's Milk, 18th ED, 2019 and Physician's Desk Reference, 71st Ed, 2017

## 2025-04-21 NOTE — TELEPHONE ENCOUNTER
Called and spoke with pt. and reviewed test results and recommendations from Dr. Geronimo..Reviewed instructions for testing and also told pt. I will send hear MCM with testing details and that she  will need to schedule testing thru [facility].  Pt. verbalizes understanding agrees to plan, told pt. That testing needs to be done sooner rather than later.  Pt. Agrees to instructions.

## 2025-04-21 NOTE — TELEPHONE ENCOUNTER
----- Message from Alivia Geronimo sent at 4/19/2025  9:37 PM CDT -----  Regarding: Elevated 1hr GTT  Hi ladies,Please inform patient that glucose tolerance test is abnormal and 3 hr GTT indicated.Please provide instructions for completion of 3 hr GTT. Order has been placed.Thanks,Dr. Geronimo

## 2025-05-15 NOTE — PROGRESS NOTES
SHERON 29w1d    Doing well, +FM  Denies contractions  Denies LOF, VB      FHT-P  PNL:  3T labs ordered  RH negative: antibody screen ordered. Will need rhogam ASAP after antibody screen results  Mode of delivery: anticipate    Immunizations: TDAP discussed and ordered today  Obesity: growth 32 weeks   labor precautions reviewed  Fetal movement discussed    Return in 2 weeks

## 2025-05-30 NOTE — PATIENT INSTRUCTIONS
LABOR & DELIVERY PRE-REGISTRATION    Thank you for choosing [facility] for you labor and delivery needs.  We look forward to caring for you and your family in this exciting time.  In order to better care for all of our patients, [facility] recommends that you complete your delivery registration as early in your pregnancy as possible.  Registering for your delivery in advance saves you the time of doing so on your day of delivery and allows your care team to be better prepared for your delivery date.  For more information about Labor and Delivery at [facility] and to pre-register, visit [employer]--> Search Our Services-->Pregnancy & Baby.    TWO WAYS TO REGISTER ONLINE    Epic MyChart allows access to multiple medical organizations, including [facility] and other medical organizations that use the Epic electronic health record system.  To add [facility] or any other medical organization, just tap My Organizations at the top left corner of the login page in the [employer] helga, and then click Add Organization.  [facility]- Pre-register for your hospital stay through your [employer] account.  After logging into [employer], click on Visits.  Under Visits, click on [facility] for Delivery and follow the directions to register.  You may print the confirmation page.  If you do not already have a [employer] account, ask our office for an Access Code.  Go to [email or web address] and follow the prompts to set up your account.  [facility] on [email or web address]- Pre-register for your hospital stay through the convenient online form on our website.  Go to [email or web address].  Scroll down the page and click on [facility].  Fill out all of the required information and click submit.  You will receive a confirmation of your submission.  Questions about registering for your hospital stay?  Contact the [facility]-Labor & Delivery at [phone].      PRENATAL CLASSES    Both in-person and  virtual classes are available.  Weeknight and weekend classes are available.  Go to [email or web address]   or   [email or web address]  Click on drop down menu on the right side  Scroll down to \"Find a Class or Support Group\"  Scroll down and click on \"[facility]\"  Type any of the following in the Search Bar  - Babycare  - Virtual Tour- for the [facility]  - Natural Childbirth (even if you are planning on having an epidural).  Topics include labor and birth, breathing techniques, epidurals, postpartum issues.  - Virtual Childbirth Express  - Breastfeeding       Pediatrician/Family Practice Referral    Pediatricians:    [facility]  [address]  [place], [place]  [phone]    [facility]  [address]  [place], [place]   [phone]    [facility]  [address]  [phone]    [facility]  [address]  [place], [place]  [phone]        Family Practice:    Dr. Dee Dee Malone and Dr. Ravinder Holloway  [address]  [place], [place]  [phone]    Dr. Adriel Holly and Dr. Alfie Lai  [address] [address]  [phone]    Dr. Alise Khan  [address] [place] [address]  [phone]    Dr. Rina Baldwin  [address] [place], [place]  [phone]    Dr. Nan Mccarty  [address] [place] [address]  [phone]    Dr. Angeles Huffman, Dr. Bryant Fisher and Dr. Janie King  [address] [place] [address]  [phone]    Dr. Caroline Nunez  [address] [place] [address]  [phone]     KICK COUNT INSTRUCTIONS    After 28 weeks of pregnancy, we would like for you to monitor your baby’s movement daily by doing kick counts, an easy way for you to assess your baby’s well-being.    How to count kicks:  Choose a time when the baby is active, such as after a meal.  Sit comfortably or lie on your side,  and count the number of movements in an hour… you should feel 10 movements in 2 hours    The evening hours seem to be the most convenient time to do this test, although you can also do this test anytime you are concerned about the baby’s movement.    Call the office right away at [phone] if you notice any of the following:  Your baby moves less than 10 times in 2 hours while you are doing kick counts.  Your baby moves much less often than on the days before.  You have not felt your baby move all day.

## 2025-05-30 NOTE — PROGRESS NOTES
Outpatient Maternal-Fetal Medicine Follow-Up    Dear Dr. Geronimo    Thank you for requesting an ultrasound and maternal-fetal medicine consultation on your patient Diana Lewis.  As you are aware she is a 25 year old female  with a trejo pregnancy and an Estimated Date of Delivery: 25.  She returned to maternal-fetal medicine today for a follow-up visit.  Her history was reviewed from her prior visit and there were no interval changes.    Antepartum Risk Factors  Obesity  SSRI Use In Pregnancy    PHYSICAL EXAMINATION:  /83 (BP Location: Right arm, Patient Position: Sitting, Cuff Size: large)   Pulse 98   Ht 5' 3\" (1.6 m)   Wt 210 lb (95.3 kg)   LMP 10/23/2024 (Exact Date)   BMI 37.20 kg/m²   General: alert and oriented, no acute distress  Abdomen: gravid, soft, non-tender  Extremities: non-tender, no edema    OBSTETRIC ULTRASOUND  The patient had a follow-up growth ultrasound today which revealed normal interval fetal growth, BPP 8/8.    Ultrasound Findings:  Single IUP in cephalic presentation.    Placenta is posterior.   A 3 vessel cord, 3 vessel cord is noted. Normal cord insertion.  Cardiac activity is present at 150 bpm  EFW 2019 g ( 4 lb 7 oz); 52%.    CATY is  18.3 cm.  MVP is 5.5 cm  BPP is 8/8.     The fetal measurements are consistent with established RAMBO. No gross ultrasound evidence of structural abnormalities are seen today. The patient understands that ultrasound cannot rule out all structural and chromosomal abnormalities.     See Imaging Tab For Complete Ultrasound Report  I interpreted the results and reviewed them with the patient.    DISCUSSION  During her visit we discussed and reviewed the following issues:  OBESITY  See prior [facility] notes for a detailed review.    IMPRESSION:  IUP at 32w2d  Obesity  SSRI Use In Pregnancy    RECOMMENDATIONS:  Continue care with Dr. Geronimo  Growth US & BPP at 32 weeks  Weekly NSTs at 36 weeks  11-20 lb weight gain for  pregnancy    Thank you for allowing me to participate in the care of your patient.  Please do not hesitate to contact me if additional questions or concerns arise.      Yury Ordoñez M.D.    20 minutes spent in review of records, patient consultation, documentation and coordination of care.  The relevant clinical matter(s) are summarized above.     Note to patient and family  The 21st Century Cures Act makes medical notes available to patients in the interest of transparency.  However, please be advised that this is a medical document.  It is intended as atjz-bz-pvpi communication.  It is written and medical language may contain abbreviations or verbiage that are technical and unfamiliar.  It may appear blunt or direct.  Medical documents are intended to carry relevant information, facts as evident, and the clinical opinion of the practitioner.

## 2025-05-30 NOTE — PROGRESS NOTES
Return OB Visit 28-33 WGA      GA: 31w2d  Vitals:    25 0920   BP: 118/60   Pulse: 112   Weight: 209 lb (94.8 kg)   Height: 63\"       Doing well, +FM  Denies LOF/VB/uctx  Rh negative rhogam given today, TDAP received, EPDS Depression Scale Total: 0 (2025  4:00 PM)   PTL and Fetal movement instructions given  3rd T HIV/RPR NR      Patient Active Problem List    Diagnosis    Encounter for supervision of normal first pregnancy in second trimester ([facility])    Rh negative state in antepartum period ([facility])     [X ] Rhogam at 28 weeks       Obesity in pregnancy (HCC)     -Declines genetic screening   [X] Early 1 hr GTT - passed   [x] BASA   [x] Level 2/MFM   Growth US & BPP at 32 weeks  Weekly NSTs at 36 weeks  11-20 lb weight gain for pregnancy      Anxiety     Sertraline 25mg daily  [x] Level 2 with MFM       Migraine with status migrainosus    Allergic rhinitis     negative allergy testing 09           RTC in 2 wks      Note to patient and family   The  [employer] Cures Act makes medical notes available to patients in the interest of transparency.  However, please be advised that this is a medical document.  It is intended as goot-hi-qvrc communication.  It is written and medical language may contain abbreviations or verbiage that are technical and unfamiliar.  It may appear blunt or direct.  Medical documents are intended to carry relevant information, facts as evident, and the clinical opinion of the practitioner.      Sharif Morales MD

## 2025-06-12 NOTE — TELEPHONE ENCOUNTER
Patient dropped off forms in [place] for her . KELLY completed and $25 fee collected. E-mailed to Forms Department and original sent to [facility] via .

## 2025-06-12 NOTE — PROGRESS NOTES
Chief Complaint   Patient presents with    Prenatal Care     SHERON      25 year old  at 33w1d who presents for routine OB visit without complaints. Doing well.   Patient denies any bleeding, leaking fluid, cramping, or contractions.    Assessment/Plan:   33w1d doing well.  Continue routine prenatal care  Kick counts reminded  Reviewed  labor signs and symptoms.    Diagnoses and all orders for this visit:    Encounter for supervision of normal first pregnancy in third trimester (HCC)    Return in about 2 weeks (around 2025) for then weekly wtih NST 36 weeks.     Subjective:   Review of Systems:  General: no complaints per category. See HPI for additional information.   Breast: no complaints per category. See HPI for additional information.   Respiratory: no complaints per category. See HPI for additional information.   Cardiovascular: no complaints per category. See HPI for additional information.   GI: no complaints per category. See HPI for additional information.   : no complaints per category. See HPI for additional information.   Heme: no complaints per category. See HPI for additional information.   Obstetrical History:   OB History    Para Term  AB Living   1 0 0 0 0 0   SAB IAB Ectopic Multiple Live Births   0 0 0 0 0      # Outcome Date GA Lbr Al/2nd Weight Sex Type Anes PTL Lv   1 Current              Gynecological History: na  Medications:  Current Medications[1]   Allergies:  Allergies[2]  Past Medical History:  Past Medical History[3]  Past Surgical History:  Past Surgical History[4]  Immunizations:   Immunization History   Administered Date(s) Administered    Covid-19 Vaccine Pfizer 30 mcg/0.3 ml 2021, 2021    DTAP 2005    DTAP/HIB 2000, 2000, 2000, 2001    HEP A 2012, 2013    HEP B 2000, 10/20/2000, 2001    HIB 2000, 2000, 2000, 2001, 2001    Hpv Virus Vaccine 9 Leana Im  2020, 2020, 2020    IPV 2000, 2000, 2001, 2005    MMR 2001, 2005    Meningococcal-Menactra 2011, 2016    RHO(D) Immune Globulin 2025    TDAP 2011, 2021, 05/15/2025    Tb Intradermal Test 2001, 2020    Varicella Deferred (Had Chicken Pox) 2004     Objective:     Vitals:    25 1007   BP: 112/74   Pulse: 96   Weight: 209 lb 8 oz (95 kg)   Height: 63\"     Body mass index is 37.11 kg/m².  Physical Examination:  General appearance: Well dressed, well nourished in no apparent distress  Neurologic/Psychiatric: Alert and oriented to person, place and time, mood normal, affect appropriate  Abdomen: Soft, non-tender, non-distended-   Skin: General inspection- no rashes, lesions or discoloration  Patient Active Problem List    Diagnosis    Encounter for supervision of normal first pregnancy in third trimester ([facility])    Rh negative state in antepartum period ([facility])     [X ] Rhogam at 28 weeks       Obesity in pregnancy (HCC)     -Declines genetic screening   [X] Early 1 hr GTT - passed   [x] BASA   [x] Level 2/MFM   Growth US & BPP at 32 weeks  Weekly NSTs at 36 weeks  11-20 lb weight gain for pregnancy      Anxiety     Sertraline 25mg daily  [x] Level 2 with MFM       Migraine with status migrainosus    Allergic rhinitis     negative allergy testing 09         Total patient time was 20 minutes in reviewing paper/electronic records, reviewing test results from outside care provider, obtaining history, evaluating the patient, consultation, discussing treatment options, coordination of care and completing documentation. This included face to face and non-face to face actions. The patient's questions and concerns were addressed.          [1]    Docusate Sodium (STOOL SOFTENER OR) Take by mouth daily as needed.      BABY ASPIRIN OR Take by mouth.      sertraline 25 MG Oral Tab Take 1 tablet (25 mg total) by mouth in  the morning.      prenatal vitamin with DHA 27-0.8-228 MG Oral Cap Take 1 capsule by mouth in the morning.     [2] No Known Allergies  [3]   Past Medical History:   Finger fracture    Injury of left ankle, initial encounter    Migraines    Well adult exam   [4]   Past Surgical History:  Procedure Laterality Date    Alma teeth removed

## 2025-06-16 NOTE — TELEPHONE ENCOUNTER
Received Family Medical Leave Act form for spouse with valid Release of Information logged for processing.

## 2025-06-16 NOTE — TELEPHONE ENCOUNTER
Called patient to obtain details for spouses Family Medical Leave Act.     Type of Leave: Continuous leave for spouse to care for patient   Reason for Leave: Paternity   Start date of leave: 7/30/2025 on or around  End date of leave: 4 weeks   How many flare ups per month/length?:  How many appts per month/length?:   Was Fee and Turnaround info Given?:

## 2025-06-17 NOTE — TELEPHONE ENCOUNTER
Spouses Family Medical Leave Act completed and e-faxed to  [facility] FMLA Coordinator awaiting confirmation. Confirmation received, [employer] message sent to patient.

## 2025-06-17 NOTE — TELEPHONE ENCOUNTER
Good morning Chelo,    Please sign off on form if you agree to:   Family Medical Leave Act for spouse to care for patient due to pregnancy. Start date 07/30/25 - 4 weeks  -Signature page will be the first page scanned  -From your Inbasket, Highlight the patient and click Chart   -Double click the 06/12/25 Forms Completion telephone encounter  -Scroll down to the Media section   -Click the blue Hyperlink: Priscilla spouse, Cielo Hobson, 06/17/25  -Click Acknowledge located in the top right ribbon/menu   -Drag the mouse into the blank space of the document and a + sign will appear. Left click to   electronically sign the document.  -Once signed, simply exit out of the screen and you signature will be saved.     Thank you so much,  Kayla  Forms Dept.

## 2025-07-02 NOTE — PROGRESS NOTES
SHERON 36w0d    Doing well, +FM  Denies contractions  Denies LOF, VB      FHT-NST raective  PNL:  GBS today  Mode of delivery: anticipate    Immunizations: s/p TDAP  Obesity: weekly NST   labor precautions reviewed  Fetal movement discussed    Return in 1 weeks

## 2025-07-11 NOTE — PROGRESS NOTES
SHERON 37w2d    Doing well, +FM  Denies contractions  Denies LOF, VB      FHT-NST raective  PNL:  GBS negative  Mode of delivery: anticipate , discussed IOL - declines  Immunizations: s/p TDAP  Obesity: weekly NST  Labor precautions reviewed  Fetal movement discussed     Return in 1 weeks

## 2025-07-16 NOTE — TELEPHONE ENCOUNTER
----- Message from Alivia Geronimo sent at 7/16/2025 10:26 AM CDT -----  Regarding: OB IOL Request  Percy Odonnell,    Request IOL at/between this GA: 40-41 wks  Estimated Date of Delivery: Estimated Date of Delivery: 7/30/25  Indication for IOL:  post-dates  Time of appointment: PM  Cervical ripening with cytotec: yes  IOL with pitocin: yes, per protocol   OB history/complications: Obesity, RH negative, anxiety, migraines    Post partum follow up recommendations  [ ] 2 week   [x] 6 week   [ ] Other:     -----------------------------------------------------------    To do list:  [ ] Scheduled surgical case  [ ] Prior Authorization   [ ] Confirm case in surgery calendar   [ ] Confirm with primary surgeon   [ ] Confirmand notify the surgical assist  [ ] Contact patient directly regarding surgical case information   [ ] Pre Op orders   [ ] Post op follow up appointment(s)    Week prior to case to do list:  [ ] Confirm time/date with surgical department   [ ] Confirm case in surgery calendar   [ ] Confirm physician and surgical assist have not changed   [ ] Notify physician and surgical assist if changes were made      ThanksDr. Geronimo

## 2025-07-16 NOTE — PROGRESS NOTES
SHERON    GA: 38w0d  Vitals:    25 0932   BP: 114/68   Pulse: 103   Weight: 216 lb (98 kg)   Height: 63\"       Doing well, +FM  Denies LOF/VB/uctx  SVE 1/L/H, firm and posterior     Assessment:     Diana Lewis is a 25 year old  at 38w0d who presents for routine OB visit. Overall doing well.     Problem List Items Addressed This Visit       Allergic rhinitis    Migraine with status migrainosus    Anxiety    Obesity in pregnancy ([facility]) - Primary    Relevant Orders    FETAL NON-STRESS TEST EM[facility] ONLY 91906 (Completed)    Rh negative state in antepartum period ([facility])    Encounter for supervision of normal first pregnancy in third trimester ([facility])           Plan:     Patient Active Problem List    Diagnosis    Encounter for supervision of normal first pregnancy in third trimester ([facility])    Rh negative state in antepartum period ([facility])     [X ] Rhogam at 28 weeks       Obesity in pregnancy ([facility])     -Declines genetic screening   [X] Early 1 hr GTT - passed   [x] BASA   [x] Level 2/MFM   Growth US & BPP at 32 weeks  Weekly NSTs at 36 weeks  11-20 lb weight gain for pregnancy      Anxiety     Sertraline 25mg daily  [x] Level 2 with MFM       Migraine with status migrainosus    Allergic rhinitis     negative allergy testing 09         - continue routine prenatal care   - labor and rupture of membrane precautions provided  GBS   Lab Results   Component Value Date    GBS Negative 2025      Fetal movement count given  Labor precautions provided   IOL request sent for post-dates    Return to clinic in 1 week for SHERON visit w/ NST. NST reactive and reassuring       Alivia Geronimo,   [facility] - OBGYN      Note to patient and family   The 21st Century Cures Act makes medical notes available to patients in the interest of transparency.  However, please be advised that this is a medical document.  It is intended as bcoy-qu-teos communication.  It is written and medical language may contain  abbreviations or verbiage that are technical and unfamiliar.  It may appear blunt or direct.  Medical documents are intended to carry relevant information, facts as evident, and the clinical opinion of the practitioner.

## 2025-07-23 NOTE — PROGRESS NOTES
The following individual(s) verbally consented to be recorded using ambient AI listening technology and understand that they can each withdraw their consent to this listening technology at any point by asking the clinician to turn off or pause the recording:    Name: Diana Lewis    SHERON  25 year old yo, , at GA 39w0d    History of Present Illness  Diana Lewis is a 25 year old female who presents for evaluation of labor progression.    She has been experiencing light cramping over the past two days, which she hopes indicates progression towards labor. Last week, she was one centimeter dilated and is interested in checking for any further progress.No discrete contractions, only light cramping.    Vitals:    25 0900   BP: 110/72   Pulse: 95   Weight: 218 lb (98.9 kg)   Height: 63\"       Doing well, +FM  Denies LOF/VB/uctx  SVE /-3, soft, thick, posterior.  Membrane sweep performed.      Assessment:     Diana Lewis is a 25 year old  at 39w0d who presents for routine OB visit. Overall doing well.     Problem List Items Addressed This Visit          Gravid and     Obesity in pregnancy (HCC) - Primary    Relevant Orders    Fetal Non Stress Test [51141] (Completed)     Other Visit Diagnoses         Third trimester pregnancy (HCC)                  Plan:     Assessment & Plan  Prenatal care  At term with light cramping over the past two days, possibly indicating early labor. Currently 1 cm dilated, cervix 30% effaced, still posterior, and soft. Fetal head is down. Membrane sweep performed to potentially induce labor, with a 50% chance of triggering labor contractions within 48 hours. Eager to deliver and prepared for birth. Informed of the possibility of remaining at 1 cm dilation and the potential need for induction if labor does not occur before 40 weeks.  - Advise to monitor for signs of labor, such as continuous fluid leakage or persistent bleeding, and  to go to the hospital if these occur.  - Schedule follow-up appointment if labor does not occur before 40 weeks.    Patient Active Problem List    Diagnosis    Encounter for supervision of normal first pregnancy in third trimester ([facility])    Rh negative state in antepartum period ([facility])     [X ] Rhogam at 28 weeks       Obesity in pregnancy ([facility])     -Declines genetic screening   [X] Early 1 hr GTT - passed   [x] BASA   [x] Level 2/MFM   Growth US & BPP at 32 weeks  Weekly NSTs at 36 weeks  11-20 lb weight gain for pregnancy      Anxiety     Sertraline 25mg daily  [x] Level 2 with MFM       Migraine with status migrainosus    Allergic rhinitis     negative allergy testing 9/9/09         - continue routine prenatal care   - labor and rupture of membrane precautions provided    GBS   Lab Results   Component Value Date    GBS Negative 07/02/2025        Fetal movement count given  Labor precautions provided     Mode of delivery: IOL 8/5 7:30 pm with cytotec     Return to clinic in 1 week for SHERON visit.     NST: reactive and reassuring.     Lev Lee MD   [facility] - OBGYN      Note to patient and family   The 21st Century Cures Act makes medical notes available to patients in the interest of transparency.  However, please be advised that this is a medical document.  It is intended as tbga-ca-tgkb communication.  It is written and medical language may contain abbreviations or verbiage that are technical and unfamiliar.  It may appear blunt or direct.  Medical documents are intended to carry relevant information, facts as evident, and the clinical opinion of the practitioner.

## 2025-07-23 NOTE — PATIENT INSTRUCTIONS
Labor Instructions    How do I know if it’s true labor?  One of the most important aspects of any pregnancy is being able to recognize the onset of labor.  Unfortunately, on occasion it can be difficult or confusing, especially if you have had one or more children.  Each labor can begin in a different way even if you have had four or five children.    If this is your first child, it is very common to have labor for an average greater than 10 hours; however, there have been rare instances for labor to be two hours or less for a first time mother. It is more important for you to know if this is your second or third baby to realize that any labor after the first is usually shorter.  There is no way to tell how long or short the labor will be. Therefore, please call us if you are unsure labor has started.       Usually, during the last six weeks of pregnancy, John-Gee contractions or “false labor pains occur”.  False labor is generally not very painful though it is not always easy to tell.  You may feel contractions, cramps or uterine tightening somewhere between every 3-30 minutes but they will not continue to get stronger over time.  If you lie down, drink plenty of fluid or walk around, the contractions may go away.   False contractions are very common if you have been active on your feet for several hours.   Women frequently worry about being sent home from the hospital without having their baby (i.e. the labor stopped).  Actually, this is an unnecessary worry, for this is an infrequent occurrence.     True labor usually begins in one of two ways.  In most patients it begins with contractions of the uterus, which are irregular (but not always) in the beginning.  They are cramp-like in character and feel similar to menstrual cramps.  After a while, they become more regular, and they seem to last a little longer, and feel a little sharper.  These symptoms are very important-more important- than the timing of the  contractions.  Having regular (usually closer), longer lasting (35-70 seconds), and sharper (more painful) contractions are the common symptoms of actual labor.  The second way in which labor can begin which occurs in approximately 30% of all patients is the rupture of the bag of water.  This is a sudden gush of watery fluid, usually sufficient to run down your leg and onto the floor, or you may wet a large area of the bed if it happens at night.  There may also be tricking that is uncontrolled.  If you are unsure, please call the office.      When should I call?  When contractions are strong and every 3-5 minutes.  If you have a gush of water or you think you might be leaking fluid.  If you are bleeding heavily.  If your baby is not moving around at least every 1-2 hours.  If you are worried about something.  When you think you are ready to go to the hospital.    Who do I call?  Call the office at [phone].  If the office is closed, the answering service will send a message to the physician on call.  The on call physician will be available for emergency phone calls only.          Can I eat in labor?  It is good to eat a light meal at home before going to the hospital.  Eat foods like crackers, popsicles, soup and fruit.  Avoid foods that are difficult to digest like meat, a lot of dairy products and high fat foods.  DO NOT EAT if you know you are scheduled for a  ().      What will happen when I get to the hospital?  When you arrive at the hospital, you will be admitted and examined.   There are a few factors that will determine if you will be allowed to be up out of bed or if you would need to stay in bed.  The main factors are how well the baby is entering into the pelvis and if the bag of [place] is in intact or ruptured.  An intravenous (IV) solution will, with exceptions, be started.  This is extremely important especially for the baby.   Your  will be allowed in the room during your  labor. During the delivery, the nurses will inform you of the hospital policy and how many coaches are allowed.  You may desire pain medication or anesthesia for pain.  You probably discussed some aspects of pain medication with us during your prenatal care.  The various options may also have been discussed in Prenatal Classes.  We utilize IV narcotics and epidural anesthesia when our patients request to have them.  If you chose to have no anesthesia, none will be administered.  A local anesthetic may be used at the time of delivery      What should I to bring to the hospital?  Maternity clothes to go home in  You can bring your own night gown to wear after giving birth, but most women prefer to wear the hospital gown because it may get soiled  Nursing Bra if you are planning to breastfeed  Clothes for your baby to go home in  Baby Car Seat.  Be sure you know how to install it correctly. Please install it before going to the hospital  Routine toiletries like toothbrush, shampoo, hairbrush and etc.   You can bring your favorite pillow, but please put a colored pillow case on it so it doesn’t get mixed up with hospital pillows    How long will I stay in the hospital?  The date you leave the hospital may vary depending on the speed of your recovery.  If you have a vaginal delivery, you will stay in the hospital 24-48 hours after your delivery as long as you aren’t having any complications.    If you have had a , you will stay in the hospital 48-72 hours as long as you aren’t having any complications.       Going Home Instructions  There are no set rules as to what you may do each day or week after you are home.  You will receive discharge instructions to help you each day.  Remember, early ambulation in the hospital is to prevent complications.  Do not let this lull you into a false sense of strength or ability to do certain physical acts which may tire you excessively.  Please call the office within a few days  after you are discharged from the hospital to schedule your post-partum visit, which is usually 4-6 weeks after delivery.    Any medications necessary will be discussed on an individual basis.  If you decide to breastfeed your baby, you should continue your prenatal vitamins.  If you do not breastfeed, simply finish the prenatal vitamins you have.      The staff at [facility] wish you a joyous and exciting birth.  If there is anything we can do to make this a better experience for you please do not hesitate to ask.

## (undated) NOTE — LETTER
25    Re: Diana Lewis  : 2000    To Whom It May Concern:  Diana Lewis is under my care for pregnancy.  Please excuse her partner, Ricardo, from work for .  If you have any questions concerning this letter, please feel free to contact my office.      Sincerely yours,      Lev Lee MD